# Patient Record
Sex: FEMALE | Race: WHITE | NOT HISPANIC OR LATINO | Employment: OTHER | URBAN - METROPOLITAN AREA
[De-identification: names, ages, dates, MRNs, and addresses within clinical notes are randomized per-mention and may not be internally consistent; named-entity substitution may affect disease eponyms.]

---

## 2023-06-07 LAB
CREATININE (MG/DL) IN SER/PLAS: 0.9 MG/DL (ref 0.5–1.05)
GFR FEMALE: 70 ML/MIN/1.73M2

## 2023-06-29 LAB — THYROTROPIN (MIU/L) IN SER/PLAS BY DETECTION LIMIT <= 0.05 MIU/L: 3.74 MIU/L (ref 0.44–3.98)

## 2023-06-30 LAB — PARATHYRIN INTACT (PG/ML) IN SER/PLAS: 115.7 PG/ML (ref 18.5–88)

## 2024-02-18 ENCOUNTER — APPOINTMENT (OUTPATIENT)
Dept: RADIOLOGY | Facility: HOSPITAL | Age: 69
End: 2024-02-18
Payer: COMMERCIAL

## 2024-02-18 ENCOUNTER — HOSPITAL ENCOUNTER (EMERGENCY)
Facility: HOSPITAL | Age: 69
Discharge: HOME | End: 2024-02-18
Payer: COMMERCIAL

## 2024-02-18 VITALS
RESPIRATION RATE: 18 BRPM | OXYGEN SATURATION: 100 % | HEART RATE: 72 BPM | BODY MASS INDEX: 19.99 KG/M2 | WEIGHT: 120 LBS | HEIGHT: 65 IN | DIASTOLIC BLOOD PRESSURE: 72 MMHG | TEMPERATURE: 97.3 F | SYSTOLIC BLOOD PRESSURE: 122 MMHG

## 2024-02-18 DIAGNOSIS — M54.2 CERVICAL PAIN (NECK): Primary | ICD-10-CM

## 2024-02-18 DIAGNOSIS — M50.30 DDD (DEGENERATIVE DISC DISEASE), CERVICAL: ICD-10-CM

## 2024-02-18 PROCEDURE — 72125 CT NECK SPINE W/O DYE: CPT

## 2024-02-18 PROCEDURE — 2500000001 HC RX 250 WO HCPCS SELF ADMINISTERED DRUGS (ALT 637 FOR MEDICARE OP): Performed by: NURSE PRACTITIONER

## 2024-02-18 PROCEDURE — 72125 CT NECK SPINE W/O DYE: CPT | Performed by: RADIOLOGY

## 2024-02-18 PROCEDURE — 99284 EMERGENCY DEPT VISIT MOD MDM: CPT | Mod: 25 | Performed by: NURSE PRACTITIONER

## 2024-02-18 PROCEDURE — 99284 EMERGENCY DEPT VISIT MOD MDM: CPT | Mod: 25

## 2024-02-18 RX ORDER — CYCLOBENZAPRINE HCL 10 MG
5 TABLET ORAL ONCE
Status: COMPLETED | OUTPATIENT
Start: 2024-02-18 | End: 2024-02-18

## 2024-02-18 RX ORDER — NALOXONE HYDROCHLORIDE 4 MG/.1ML
4 SPRAY NASAL AS NEEDED
Qty: 1 EACH | Refills: 0 | Status: SHIPPED | OUTPATIENT
Start: 2024-02-18 | End: 2025-02-17

## 2024-02-18 RX ORDER — OXYCODONE AND ACETAMINOPHEN 5; 325 MG/1; MG/1
1 TABLET ORAL ONCE
Status: COMPLETED | OUTPATIENT
Start: 2024-02-18 | End: 2024-02-18

## 2024-02-18 RX ORDER — CYCLOBENZAPRINE HCL 10 MG
5 TABLET ORAL 2 TIMES DAILY PRN
Qty: 20 TABLET | Refills: 0 | Status: SHIPPED | OUTPATIENT
Start: 2024-02-18 | End: 2024-02-28

## 2024-02-18 RX ORDER — OXYCODONE AND ACETAMINOPHEN 5; 325 MG/1; MG/1
1 TABLET ORAL EVERY 6 HOURS PRN
Qty: 12 TABLET | Refills: 0 | Status: SHIPPED | OUTPATIENT
Start: 2024-02-18 | End: 2024-02-21

## 2024-02-18 RX ADMIN — CYCLOBENZAPRINE 5 MG: 10 TABLET, FILM COATED ORAL at 16:19

## 2024-02-18 RX ADMIN — OXYCODONE HYDROCHLORIDE AND ACETAMINOPHEN 1 TABLET: 5; 325 TABLET ORAL at 16:19

## 2024-02-18 ASSESSMENT — PAIN SCALES - GENERAL: PAINLEVEL_OUTOF10: 10 - WORST POSSIBLE PAIN

## 2024-02-18 ASSESSMENT — COLUMBIA-SUICIDE SEVERITY RATING SCALE - C-SSRS
1. IN THE PAST MONTH, HAVE YOU WISHED YOU WERE DEAD OR WISHED YOU COULD GO TO SLEEP AND NOT WAKE UP?: NO
6. HAVE YOU EVER DONE ANYTHING, STARTED TO DO ANYTHING, OR PREPARED TO DO ANYTHING TO END YOUR LIFE?: NO
2. HAVE YOU ACTUALLY HAD ANY THOUGHTS OF KILLING YOURSELF?: NO

## 2024-02-18 ASSESSMENT — PAIN - FUNCTIONAL ASSESSMENT: PAIN_FUNCTIONAL_ASSESSMENT: 0-10

## 2024-02-18 ASSESSMENT — LIFESTYLE VARIABLES
EVER FELT BAD OR GUILTY ABOUT YOUR DRINKING: NO
HAVE YOU EVER FELT YOU SHOULD CUT DOWN ON YOUR DRINKING: NO
EVER HAD A DRINK FIRST THING IN THE MORNING TO STEADY YOUR NERVES TO GET RID OF A HANGOVER: NO
HAVE PEOPLE ANNOYED YOU BY CRITICIZING YOUR DRINKING: NO

## 2024-02-18 ASSESSMENT — PAIN DESCRIPTION - LOCATION: LOCATION: ARM

## 2024-02-18 ASSESSMENT — PAIN DESCRIPTION - DESCRIPTORS: DESCRIPTORS: BURNING

## 2024-02-18 ASSESSMENT — PAIN DESCRIPTION - ORIENTATION: ORIENTATION: LEFT

## 2024-02-18 ASSESSMENT — PAIN DESCRIPTION - PAIN TYPE: TYPE: ACUTE PAIN

## 2024-02-18 NOTE — ED PROVIDER NOTES
[facility]  [facility]  ED  Encounter Note  Admit Date/RoomTime: 2024  3:57 PM  ED Room: 01/[facility]  NAME: Araceli Green  : 1955  MRN: [id number]     Chief Complaint:  Arm Pain (Spine pain 3 weeks ago that progressed to pain radiating down left arm.  Left arm pain is 10/10 plus, unable to sleep.  PCP has sent her to PT and spine MD appt scheduled.)    HISTORY OF PRESENT ILLNESS        Araceli Green is a 68 y.o. female who presents to the ED for evaluation of cervical spine pain. Patient recently saw her PCP and ordered plain films and steroid taper about 1-3 weeks ago. She stated that she felt she was unable to tolerate the steroids and stopped them and started ibuprofen.    She stated that lat night she had worsening pain and felt some pain down both arms. The pain is  burning type pain. She stated that she resumed her steroids today and no improvement. Pain feels somewhat worse.    No trauma, fever or chills. No loss of strength in upper extremities.  Has an appt with her spine doctor tomorrow. She wants to discuss if she needs PT with him.     ROS   Pertinent positives and negatives are stated within HPI, all other systems reviewed and are negative.    Past Medical History:  has no past medical history on file.    Surgical History:  has no past surgical history on file.    Social History:  reports that she has never smoked. She has never used smokeless tobacco. She reports that she does not use drugs. denied nicotine alcohol or street drug use    Family History: family history is not on file.     Allergies: Sulfa (sulfonamide antibiotics)    PHYSICAL EXAM   Oxygen Saturation Interpretation: Normal.     Physical Exam  Constitutional/General: Alert and oriented x3, well appearing, non toxic  HEENT:  NC/NT. PERRLA.  Airway patent.  Neck: Supple, full ROM. No midline vertebral tenderness or crepitus.   Respiratory: Lung sounds clear to auscultation bilaterally. No  wheezes, rhonchi or stridor. Not in respiratory distress.  CV:  Regular rate. Regular rhythm. No murmurs or rubs. 2+ distal pulses.  GI:  Abdomen soft, non-tender, non-distended. +BS. No rebound, guarding, or rigidity. No pulsatile masses.  Musculoskeletal: Moves all extremities x 4. Warm and well perfused. Capillary refill <3 seconds  Integument: Skin warm and dry. No rashes.   Neurologic: Alert and oriented with no focal deficits, symmetric strength 5/5 in the upper and lower extremities bilaterally.  Psychiatric: Normal affect.//no weakness in upper extremities     Lab / Imaging Results   (All laboratory and radiology results have been personally reviewed by myself)  Labs:  Results for orders placed or performed in visit on 06/29/23   Parathyroid Hormone, Intact   Result Value Ref Range    .7 (H) 18.5 - 88.0 pg/mL   Thyroid Stimulating Hormone   Result Value Ref Range    TSH 3.74 0.44 - 3.98 mIU/L     Imaging:  All Radiology results interpreted by Radiologist unless otherwise noted.  CT cervical spine wo IV contrast   Final Result   1.  No CT evidence of acute fracture.   2.  Degenerative changes throughout the cervical spine, as above.        Signed by: Tim Mena 2/18/2024 5:08 PM   Dictation workstation:   [id number]          ED Course / Medical Decision Making     Medications   oxyCODONE-acetaminophen (Percocet) 5-325 mg per tablet 1 tablet (1 tablet oral Given 2/18/24 1619)   cyclobenzaprine (Flexeril) tablet 5 mg (5 mg oral Given 2/18/24 1619)     Diagnoses as of 02/18/24 1929   Cervical pain (neck)   DDD (degenerative disc disease), cervical     Re-examination:    Patient’s condition stable.         MDM:        Araceli Green is a 68 y.o. female who presents to the ED for evaluation of cervical spine pain. Patient recently saw her PCP and ordered plain films and steroid taper about 1-3 weeks ago. She stated that she felt she was unable to tolerate the steroids and stopped them and  started ibuprofen.    She stated that lat night she had worsening pain and felt some pain down both arms. The pain is  burning type pain. She stated that she resumed her steroids today and no improvement. Pain feels somewhat worse.    No trauma, fever or chills. No loss of strength in upper extremities.  Has an appt with her spine doctor tomorrow. She wants to discuss if she needs PT with him.     ED course  Had some improvement in pain.  Copy of imaging results provided to patient. She does have moderate DDD changes C5C6.  She knows importance of no long term nsaids or steroid use.  She will be discharged on oral muscle relaxers and pain meds. She was advised not to take them together but space apart. Use heat and or ice for 15 to 20 min every 4-6 hours as needed. She was provided with narcan. She can also cut the pills in half if appears to be too much for her.  She will keep the appt with the spine doctor tomorrow.  No work or driving on pain meds.  Discharged home.  Ddx: cervical spine pain DDD     Plan of Care/Counseling:  I reviewed today's visit with the patient and  in addition to providing specific details for the plan of care and counseling regarding the diagnosis and prognosis.  Questions are answered at this time and are agreeable with the plan.    ASSESSMENT     1. Cervical pain (neck)    2. DDD (degenerative disc disease), cervical      PLAN   Discharge home     New Medications     New Medications Ordered This Visit   Medications    oxyCODONE-acetaminophen (Percocet) 5-325 mg per tablet 1 tablet     Order Specific Question:   If ordered PRN for pain, nurse is permitted to administer this medication for higher pain scores based on patient preference?     Answer:   Yes    cyclobenzaprine (Flexeril) tablet 5 mg    cyclobenzaprine (Flexeril) 10 mg tablet     Sig: Take 0.5 tablets (5 mg) by mouth 2 times a day as needed for muscle spasms for up to 10 days.     Dispense:  20 tablet     Refill:  0     naloxone (Narcan) 4 mg/0.1 mL nasal spray     Sig: Administer 1 spray (4 mg) into affected nostril(s) if needed for opioid reversal or respiratory depression for up to 1 dose. May repeat every 2-3 minutes if needed, alternating nostrils, until medical assistance becomes available.     Dispense:  1 each     Refill:  0    oxyCODONE-acetaminophen (Percocet) 5-325 mg tablet     Sig: Take 1 tablet by mouth every 6 hours if needed for severe pain (7 - 10) (do not use the flexeril and pain med together) for up to 3 days.     Dispense:  12 tablet     Refill:  0     Electronically signed by GRAEME Rodriguez   **This report was transcribed using voice recognition software. Every effort was made to ensure accuracy; however, inadvertent computerized transcription errors may be present.  END OF ED PROVIDER NOTE     GRAEME Rodriguez  02/18/24 [id number]

## 2024-02-18 NOTE — DISCHARGE INSTRUCTIONS
Do not mix the muscle relaxer and the pain med together  Space apart by 6 hours  Keep appt with spine doctor tomorrow  Can use ice and or heat for 15 to 20 min every 4-6 hours as needd

## 2025-04-25 ENCOUNTER — HOSPITAL ENCOUNTER (OUTPATIENT)
Dept: RADIOLOGY | Facility: HOSPITAL | Age: 70
Discharge: HOME | End: 2025-04-25
Payer: COMMERCIAL

## 2025-04-25 DIAGNOSIS — M81.0 AGE-RELATED OSTEOPOROSIS WITHOUT CURRENT PATHOLOGICAL FRACTURE: ICD-10-CM

## 2025-04-25 PROCEDURE — 77080 DXA BONE DENSITY AXIAL: CPT

## 2025-06-25 ENCOUNTER — OFFICE VISIT (OUTPATIENT)
Dept: VASCULAR SURGERY | Facility: HOSPITAL | Age: 70
End: 2025-06-25
Payer: COMMERCIAL

## 2025-06-25 VITALS
BODY MASS INDEX: 20.63 KG/M2 | WEIGHT: 124 LBS | HEART RATE: 74 BPM | SYSTOLIC BLOOD PRESSURE: 101 MMHG | DIASTOLIC BLOOD PRESSURE: 68 MMHG

## 2025-06-25 DIAGNOSIS — I83.892 VARICOSE VEINS OF LEFT LEG WITH EDEMA: Primary | ICD-10-CM

## 2025-06-25 PROCEDURE — 1036F TOBACCO NON-USER: CPT | Performed by: PHYSICIAN ASSISTANT

## 2025-06-25 PROCEDURE — 99204 OFFICE O/P NEW MOD 45 MIN: CPT | Performed by: PHYSICIAN ASSISTANT

## 2025-06-25 PROCEDURE — 99214 OFFICE O/P EST MOD 30 MIN: CPT | Performed by: PHYSICIAN ASSISTANT

## 2025-06-25 RX ORDER — FAMOTIDINE 20 MG/1
TABLET, FILM COATED ORAL
COMMUNITY

## 2025-06-25 RX ORDER — PSYLLIUM HUSK 0.4 G
CAPSULE ORAL
COMMUNITY

## 2025-06-25 RX ORDER — CALCIPOTRIENE 50 UG/G
OINTMENT TOPICAL
COMMUNITY

## 2025-06-25 RX ORDER — DORZOLAMIDE HYDROCHLORIDE AND TIMOLOL MALEATE 20; 5 MG/ML; MG/ML
1 SOLUTION/ DROPS OPHTHALMIC 2 TIMES DAILY
COMMUNITY
Start: 2025-05-28

## 2025-06-25 RX ORDER — FLUTICASONE PROPIONATE 50 MCG
SPRAY, SUSPENSION (ML) NASAL
COMMUNITY

## 2025-06-25 RX ORDER — ESTRADIOL 0.1 MG/G
CREAM VAGINAL
COMMUNITY

## 2025-06-25 ASSESSMENT — ENCOUNTER SYMPTOMS
ABDOMINAL PAIN: 0
FEVER: 0
WEAKNESS: 0
TROUBLE SWALLOWING: 0
NUMBNESS: 0
CONSTIPATION: 0
NECK PAIN: 0
SORE THROAT: 0
SLEEP DISTURBANCE: 0
PALPITATIONS: 0
DIZZINESS: 0
DIFFICULTY URINATING: 0
WHEEZING: 0
JOINT SWELLING: 0
VOMITING: 0
DIARRHEA: 0
SHORTNESS OF BREATH: 0
ADENOPATHY: 0
WOUND: 0
CHILLS: 0
SEIZURES: 0
HEADACHES: 0
FACIAL ASYMMETRY: 0
SPEECH DIFFICULTY: 0
FATIGUE: 0
NAUSEA: 0
LIGHT-HEADEDNESS: 0
COUGH: 0
COLOR CHANGE: 0
CONFUSION: 0
ARTHRALGIAS: 0

## 2025-06-25 NOTE — PROGRESS NOTES
Subjective   Patient ID: Araceli Green is a 70 y.o. female who presents for New Patient Visit (NPV- VV ).  HPI  70 year old female presents as a new patient for evaluation of varicose veins. Mainly LLE. Has been heavy, achy with associated swelling, Present for over a year but has become more symptomatic over the past 6 months. She is also having left knee pain for which she has seen orthopedics, and has follow up scheduled soon. She has been doing physical therapy. Symptoms worse at the end of the day. Has not tried compression stockings. Very active walks her dog miles daily. No claudication or rest pain. No wounds or ulcerations. No history of DVT. No family history of DVT. No previous vascular testing or procedures. She is a non smoker    Medical History[1]   Surgical History[2]   Social History     Socioeconomic History    Marital status:      Spouse name: Not on file    Number of children: Not on file    Years of education: Not on file    Highest education level: Not on file   Occupational History    Not on file   Tobacco Use    Smoking status: Never    Smokeless tobacco: Never   Substance and Sexual Activity    Alcohol use: Not on file    Drug use: Never    Sexual activity: Not on file   Other Topics Concern    Not on file   Social History Narrative    Not on file     Social Drivers of Health     Financial Resource Strain: Low Risk  (8/29/2024)    Received from [facility]    Overall Financial Resource Strain (CARDIA)     Difficulty of Paying Living Expenses: Not hard at all   Food Insecurity: No Food Insecurity (8/29/2024)    Received from [facility]    Hunger Vital Sign     Worried About Running Out of Food in the Last Year: Never true     Ran Out of Food in the Last Year: Never true   Transportation Needs: No Transportation Needs (8/29/2024)    Received from [facility]    PRAPARE - Transportation     Lack of Transportation (Medical): No     Lack of Transportation  (Non-Medical): No   Physical Activity: Sufficiently Active (8/29/2024)    Received from [facility]    Exercise Vital Sign     Days of Exercise per Week: 7 days     Minutes of Exercise per Session: 40 min   Stress: No Stress Concern Present (8/29/2024)    Received from [facility]    [employer] [place] [employer] Occup[employer] - Occupational Stress Questionnaire     Feeling of Stress : Not at all   Social Connections: Socially Isolated (8/29/2024)    Received from [facility]    Social Connection and Isolation Panel [NHANES]     Frequency of Communication with Friends and Family: Once a week     Frequency of Social Gatherings with Friends and Family: Once a week     Attends [employer] Services: Never     Active Member of Clubs or Organizations: No     Attends Club or Organization Meetings: Never     Marital Status:    Intimate Partner Violence: Not on file   Housing Stability: Low Risk  (8/14/2023)    Received from [facility]    Housing Stability Vital Sign     Unable to Pay for Housing in the Last Year: No     Number of Places Lived in the Last Year: 1     Unstable Housing in the Last Year: No    Family History[3]   Current Outpatient Medications   Medication Instructions    calcipotriene (Dovonex) 0.005 % ointment 1 trang applied topically once a day for 30 day(s)    calcium carbonate-vitamin D3 1,000 mg-20 mcg (800 unit) tablet     cyclobenzaprine (FLEXERIL) 5 mg, oral, 2 times daily PRN    dorzolamide-timoloL (Cosopt) 22.3-6.8 mg/mL ophthalmic solution 1 drop, 2 times daily    estradiol (Estrace) 0.01 % (0.1 mg/gram) vaginal cream     famotidine (Pepcid) 20 mg tablet     fluticasone (Flonase Allergy Relief) 50 mcg/actuation nasal spray     multivitamin with minerals iron-free (Centrum Silver)     RX Allergies[4]   Review of Systems   Constitutional:  Negative for chills, fatigue and fever.   HENT:  Negative for congestion, sore throat and trouble swallowing.    Eyes:  Negative for visual  disturbance.   Respiratory:  Negative for cough, shortness of breath and wheezing.    Cardiovascular:  Negative for chest pain, palpitations and leg swelling.        LLE VV   Gastrointestinal:  Negative for abdominal pain, constipation, diarrhea, nausea and vomiting.   Endocrine: Negative for cold intolerance and heat intolerance.   Genitourinary:  Negative for difficulty urinating.   Musculoskeletal:  Negative for arthralgias, joint swelling and neck pain.   Skin:  Negative for color change and wound.   Neurological:  Negative for dizziness, seizures, syncope, facial asymmetry, speech difficulty, weakness, light-headedness, numbness and headaches.   Hematological:  Negative for adenopathy.   Psychiatric/Behavioral:  Negative for behavioral problems, confusion and sleep disturbance.      Vitals:    06/25/25 0941   BP: 101/68   Pulse: 74   Weight: 56.2 kg (124 lb)      Objective   Physical Exam  Constitutional:       Appearance: Normal appearance.   HENT:      Head: Normocephalic.      Right Ear: External ear normal.      Left Ear: External ear normal.      Nose: Nose normal.      Mouth/Throat:      Mouth: Mucous membranes are moist.   Eyes:      Extraocular Movements: Extraocular movements intact.   Neck:      Vascular: No carotid bruit.   Cardiovascular:      Rate and Rhythm: Normal rate and regular rhythm.      Pulses: Normal pulses.      Comments: LLE warm to the toes, motor and sensory intact, palpable pedal pulses. Mild LLE edema. Small VV cluster left calf, mild spider veins bilaterally   Pulmonary:      Effort: Pulmonary effort is normal. No respiratory distress.      Breath sounds: Normal breath sounds.   Abdominal:      Palpations: Abdomen is soft.      Tenderness: There is no abdominal tenderness.   Musculoskeletal:         General: No swelling or tenderness.      Cervical back: Normal range of motion and neck supple.      Right lower leg: No edema.      Left lower leg: Edema present.   Skin:     General:  Skin is warm and dry.      Capillary Refill: Capillary refill takes less than 2 seconds.      Findings: No lesion.   Neurological:      General: No focal deficit present.      Mental Status: She is alert and oriented to person, place, and time. Mental status is at baseline.   Psychiatric:         Mood and Affect: Mood normal.         Behavior: Behavior normal.         Thought Content: Thought content normal.         Judgment: Judgment normal.         Assessment/Plan   Problem List Items Addressed This Visit    None  Visit Diagnoses         Codes      Varicose veins of left leg with edema    -  Primary I83.892    Relevant Orders    Vascular US lower extremity venous insufficiency left    Compression Stockings 20-30 mmHg        70 year old female presents as a new patient for evaluation of varicose veins.   Symptomatic LLE VV  -Discussed pathophysiology of deep and superficial venous insufficiency  -Will obtain VI study, will call with results, (+/-) CT venogram discussed with patient  -Discussed treatment options including conservative management and observation as well as procedural treatment options including RFA venous ablation, high ligation, stab phlebectomy, venogram IVUS.   -further recommendations pending testing   -Recommend compression and elevation  -Compression stocking rx given  -Continue ambulation as tolerated  -continue to follow up with orthopedics as scheduled   -Discussed above course of care and treatment plan with patient who is in agreement, all questions answered  -Patient to call with new or worsening symptoms    -follow up after testing, further recommendations pending testing         Nayeli Espinosa PA-C 06/25/25 9:51 AM        [1] No past medical history on file.  [2] No past surgical history on file.  [3] No family history on file.  [4]   Allergies  Allergen Reactions    Sulfa (Sulfonamide Antibiotics) Rash

## 2025-07-22 ENCOUNTER — HOSPITAL ENCOUNTER (OUTPATIENT)
Dept: VASCULAR MEDICINE | Facility: HOSPITAL | Age: 70
Discharge: HOME | End: 2025-07-22
Payer: COMMERCIAL

## 2025-07-22 DIAGNOSIS — M79.89 OTHER SPECIFIED SOFT TISSUE DISORDERS: ICD-10-CM

## 2025-07-22 DIAGNOSIS — I83.892 VARICOSE VEINS OF LEFT LEG WITH EDEMA: ICD-10-CM

## 2025-07-22 PROCEDURE — 93971 EXTREMITY STUDY: CPT | Performed by: SURGERY

## 2025-07-22 PROCEDURE — 93971 EXTREMITY STUDY: CPT

## 2025-07-25 DIAGNOSIS — I83.892 VARICOSE VEINS OF LEFT LEG WITH EDEMA: Primary | ICD-10-CM

## 2025-07-28 LAB
CREAT SERPL-MCNC: 0.69 MG/DL (ref 0.5–1.05)
EGFRCR SERPLBLD CKD-EPI 2021: 94 ML/MIN/1.73M2

## 2025-08-04 ENCOUNTER — TELEPHONE (OUTPATIENT)
Dept: VASCULAR SURGERY | Facility: HOSPITAL | Age: 70
End: 2025-08-04
Payer: COMMERCIAL

## 2025-08-06 ENCOUNTER — HOSPITAL ENCOUNTER (OUTPATIENT)
Dept: RADIOLOGY | Facility: HOSPITAL | Age: 70
End: 2025-08-06
Payer: COMMERCIAL